# Patient Record
Sex: FEMALE | Race: WHITE | URBAN - METROPOLITAN AREA
[De-identification: names, ages, dates, MRNs, and addresses within clinical notes are randomized per-mention and may not be internally consistent; named-entity substitution may affect disease eponyms.]

---

## 2017-06-29 ENCOUNTER — THERAPY VISIT (OUTPATIENT)
Dept: PHYSICAL THERAPY | Facility: CLINIC | Age: 67
End: 2017-06-29
Payer: COMMERCIAL

## 2017-06-29 DIAGNOSIS — M54.42 LEFT-SIDED LOW BACK PAIN WITH LEFT-SIDED SCIATICA: Primary | ICD-10-CM

## 2017-06-29 DIAGNOSIS — M54.16 LUMBAR RADICULOPATHY: ICD-10-CM

## 2017-06-29 PROCEDURE — 97140 MANUAL THERAPY 1/> REGIONS: CPT | Mod: GP | Performed by: PHYSICAL THERAPIST

## 2017-06-29 PROCEDURE — 97161 PT EVAL LOW COMPLEX 20 MIN: CPT | Mod: GP | Performed by: PHYSICAL THERAPIST

## 2017-06-29 PROCEDURE — 97110 THERAPEUTIC EXERCISES: CPT | Mod: GP | Performed by: PHYSICAL THERAPIST

## 2017-06-29 NOTE — PROGRESS NOTES
Subjective:    Patient is a 67 year old female presenting with rehab left ankle/foot hpi.                                      Pertinent medical history includes:  Migraines and smoking.  Medical allergies: NKA.    Current medications:  Hormone replacement therapy and steroids.  Current occupation is retired.    Primary job tasks include:  Lifting (carrying, pushing, pulling).                                Objective:    System    Physical Exam    General     ROS    Assessment/Plan:

## 2017-06-29 NOTE — PROGRESS NOTES
[facility] Initial Evaluation    Subjective:    Patient is a 67 year old female presenting with rehab back hpi.   Edie López is a 67 year old female with a lumbar (left lumbar and leg pain) condition.      This is a new condition  Patient has chief complaint of 5/25/2017 when she was reaching forward and lifting something in a cooler at [employer], and two other times when reaching forward. Better with ice, Tramadol and Neurontin. She started back on her back exercises that was given for previous back problem  (right leg symptoms last time) Relief with all fours stretch and icing.    Patient reports pain:  Other (left gluteal).  Radiates to:  Thigh left and lower leg left (to ankle).  Pain is described as aching and is intermittent and reported as 1/10.   Pain is worse in the A.M..  Symptoms are exacerbated by certain positions (reaching forward)   Since onset symptoms are gradually improving.  Special tests:  X-ray (severe degenerative disc).      General health as reported by patient is excellent.                                              Objective:      Gait:    Gait Type:  Normal   Assistive Devices:  None      Flexibility/Screens:       Lower Extremity:  Decreased left lower extremity flexibility:Quadriceps                 Lumbar/SI Evaluation  ROM:  AROM Lumbar: normal      Lumbar Myotomes:  normal            Lumbar DTR's:  normal        Lumbar Dermtomes:  normal                Neural Tension/Mobility:    Left side:  Femoral Nerve positive.     Lumbar Palpation:    Tenderness present at Left:    Piriformis and Greater Trochanter  Tenderness not present at Left:    Erector Spinae; PSIS or Vertebral    Tenderness not present at Right:  Vertebral    Lumbar Provocation:  Lumbar provocation: limited left hip ER/ABD.  Left positive with:  PROM hip                                                   General     ROS    Assessment/Plan:      Patient is a 67 year old female with lumbar  complaints.    Patient has the following significant findings with corresponding treatment plan.                Diagnosis 1:  Left lumbar radiculopathy  Pain -  hot/cold therapy, manual therapy and self management  Decreased ROM/flexibility - manual therapy, therapeutic exercise and home program  Decreased strength - therapeutic exercise, therapeutic activities and home program    Therapy Evaluation Codes:   1) History comprised of:   Personal factors that impact the plan of care:      None.    Comorbidity factors that impact the plan of care are:      None.     Medications impacting care: None.  2) Examination of Body Systems comprised of:   Body structures and functions that impact the plan of care:      Hip and Lumbar spine.   Activity limitations that impact the plan of care are:      Lifting and Squatting/kneeling.  3) Clinical presentation characteristics are:   Stable/Uncomplicated.  4) Decision-Making    Low complexity using standardized patient assessment instrument and/or measureable assessment of functional outcome.  Cumulative Therapy Evaluation is: Low complexity.    Previous and current functional limitations:  (See Goal Flow Sheet for this information)    Short term and Long term goals: (See Goal Flow Sheet for this information)     Communication ability:  Patient appears to be able to clearly communicate and understand verbal and written communication and follow directions correctly.  Treatment Explanation - The following has been discussed with the patient:   RX ordered/plan of care  Anticipated outcomes  Possible risks and side effects  This patient would benefit from PT intervention to resume normal activities.   Rehab potential is excellent.    Frequency:  2 X week, once daily  Duration:  for 3 weeks  Discharge Plan:  Achieve all LTG.  Independent in home treatment program.  Reach maximal therapeutic benefit.    Please refer to the daily flowsheet for treatment today, total treatment time and  time spent performing 1:1 timed codes.

## 2017-06-29 NOTE — LETTER
[facility]  [address]  [facility] [address]  [phone]    2017    Re: Edie López   :   1950  MRN:  [id number]   REFERRING PHYSICIAN:   Shabbir Hoffman    [facility]  Date of Initial Evaluation:  2017  Visits:  Rxs Used: 1  Reason for Referral:     Left-sided low back pain with left-sided sciatica  Lumbar radiculopathy    EVALUATION SUMMARY  [facility] Initial Evaluation  Subjective:  Patient is a 67 year old female presenting with rehab back hpi.   Edie López is a 67 year old female with a lumbar (left lumbar and leg pain) condition. This is a new condition  Patient has chief complaint of 2017 when she was reaching forward and lifting something in a cooler at [employer], and two other times when reaching forward. Better with ice, Tramadol and Neurontin. She started back on her back exercises that was given for previous back problem  (right leg symptoms last time) Relief with all fours stretch and icing.    Patient reports pain:  Other (left gluteal).  Radiates to:  Thigh left and lower leg left (to ankle).  Pain is described as aching and is intermittent and reported as 1/10.   Pain is worse in the A.M..  Symptoms are exacerbated by certain positions (reaching forward)   Since onset symptoms are gradually improving.  Special tests:  X-ray (severe degenerative disc). General health as reported by patient is excellent.                Pertinent medical history includes:  Migraines and smoking.  Medical allergies: NKA.    Current medications:  Hormone replacement therapy and steroids.  Current occupation is retired.  Primary job tasks include:  Lifting (carrying, pushing, pulling).    Objective:  Gait:    Gait Type:  Normal   Assistive Devices:  None  Flexibility/Screens:   Lower Extremity:  Decreased left lower extremity flexibility:Quadriceps       Lumbar/SI Evaluation  ROM:  AROM  Lumbar: normal  Lumbar Myotomes:  normal  Lumbar DTR's:  normal  Lumbar Dermtomes:  normal            Re: Edie López   :   1950      Neural Tension/Mobility:    Left side:  Femoral Nerve positive.     Lumbar Palpation:    Tenderness present at Left:    Piriformis and Greater Trochanter  Tenderness not present at Left:    Erector Spinae; PSIS or Vertebral  Tenderness not present at Right:  Vertebral    Lumbar Provocation:  Lumbar provocation: limited left hip ER/ABD.  Left positive with:  PROM hip    Assessment/Plan:    Patient is a 67 year old female with lumbar complaints.    Patient has the following significant findings with corresponding treatment plan.                Diagnosis 1:  Left lumbar radiculopathy  Pain -  hot/cold therapy, manual therapy and self management  Decreased ROM/flexibility - manual therapy, therapeutic exercise and home program  Decreased strength - therapeutic exercise, therapeutic activities and home program    Therapy Evaluation Codes:   1) History comprised of:   Personal factors that impact the plan of care:      None.    Comorbidity factors that impact the plan of care are:      None.     Medications impacting care: None.  2) Examination of Body Systems comprised of:   Body structures and functions that impact the plan of care:      Hip and Lumbar spine.   Activity limitations that impact the plan of care are:      Lifting and Squatting/kneeling.  3) Clinical presentation characteristics are:   Stable/Uncomplicated.  4) Decision-Making    Low complexity using standardized patient assessment instrument and/or measureable assessment of functional outcome.  Cumulative Therapy Evaluation is: Low complexity.    Previous and current functional limitations:  (See Goal Flow Sheet for this information)    Short term and Long term goals: (See Goal Flow Sheet for this information)     Communication ability:  Patient appears to be able to clearly communicate and understand verbal  and written communication and follow directions correctly.  Treatment Explanation - The following has been discussed with the patient:   RX ordered/plan of care  Anticipated outcomes  Possible risks and side effects  This patient would benefit from PT intervention to resume normal activities.   Rehab potential is excellent.  Re: Edie López   :   1950      Frequency:  2 X week, once daily  Duration:  for 3 weeks  Discharge Plan:  Achieve all LTG.  Independent in home treatment program.  Reach maximal therapeutic benefit.                        Thank you for your referral.    INQUIRIES  Therapist: Cinda Daley, PT   [facility]  [address] [facility] [address]  Phone: [phone]  Fax: [phone]

## 2017-07-03 ENCOUNTER — THERAPY VISIT (OUTPATIENT)
Dept: PHYSICAL THERAPY | Facility: CLINIC | Age: 67
End: 2017-07-03
Payer: COMMERCIAL

## 2017-07-03 DIAGNOSIS — M54.42 LEFT-SIDED LOW BACK PAIN WITH LEFT-SIDED SCIATICA: ICD-10-CM

## 2017-07-03 DIAGNOSIS — M54.16 LUMBAR RADICULOPATHY: ICD-10-CM

## 2017-07-03 PROCEDURE — 97140 MANUAL THERAPY 1/> REGIONS: CPT | Mod: GP | Performed by: PHYSICAL THERAPIST

## 2017-07-03 PROCEDURE — 97110 THERAPEUTIC EXERCISES: CPT | Mod: GP | Performed by: PHYSICAL THERAPIST

## 2017-07-06 ENCOUNTER — THERAPY VISIT (OUTPATIENT)
Dept: PHYSICAL THERAPY | Facility: CLINIC | Age: 67
End: 2017-07-06
Payer: COMMERCIAL

## 2017-07-06 DIAGNOSIS — M54.16 LUMBAR RADICULOPATHY: ICD-10-CM

## 2017-07-06 DIAGNOSIS — M54.42 LEFT-SIDED LOW BACK PAIN WITH LEFT-SIDED SCIATICA: ICD-10-CM

## 2017-07-06 PROCEDURE — 97110 THERAPEUTIC EXERCISES: CPT | Mod: GP | Performed by: PHYSICAL THERAPIST

## 2017-07-06 PROCEDURE — 97140 MANUAL THERAPY 1/> REGIONS: CPT | Mod: GP | Performed by: PHYSICAL THERAPIST

## 2017-07-06 NOTE — LETTER
"[facility]  [address] [facility] [id number] [phone]    2017    Re: Edie López   :   1950  MRN:  [id number]   REFERRING PHYSICIAN:   Shabbir Hoffman    [facility]  Date of Initial Evaluation:  2017  Visits:  Rxs Used: 3  Reason for Referral:     Left-sided low back pain with left-sided sciatica  Lumbar radiculopathy    DISCHARGE REPORT  Progress reporting period is from 2017 to 2017.     SUBJECTIVE  Subjective: Significant improvement with no leg pain for past week. Lumbar area a little \"achey\" and home exercise program going well   Current Pain level: 1/10   Initial Pain level: 210   Changes in function: Yes, see goal flow sheet for change in function   Adverse reactions: None    OBJECTIVE  Objective: Lumbar ROM is full and pain free. Core strength increasing      ASSESSMENT/PLAN  STG/LTGs have been met or progress has been made towards goals:  Yes (See Goal flow sheet completed today.)  Assessment of Progress: The patient's condition is improving.  The patient's condition has potential to improve.  Self Management Plans:  Patient is independent in a home treatment program.  Patient is independent in self management of symptoms.  Edie continues to require the following intervention to meet STG and LTG's: PT intervention is no longer required to meet STG/LTG.    Recommendations:  This patient is ready to be discharged from therapy and continue their home treatment program.               Re: Edie López   :   1950                                      Thank you for your referral.    INQUIRIES  Therapist: Cinda Daley PT  [facility]  [address] [facility] [address]  Phone: [phone]  Fax: [phone]     "

## 2017-07-06 NOTE — PROGRESS NOTES
"Subjective:    HPI                    Objective:    System    Physical Exam    General     ROS    Assessment/Plan:      DISCHARGE REPORT    Progress reporting period is from 6/29/2017 to 7/6/2017.     SUBJECTIVE    Subjective: Significant improvement with no leg pain for past week. Lumbar area a little \"achey\" and home exercise program going well     Current Pain level: 1/10   Initial Pain level: 2/10   Changes in function: Yes, see goal flow sheet for change in function   Adverse reactions: None    OBJECTIVE    Objective: Lumbar ROM is full and pain free. Core strength increasing      ASSESSMENT/PLAN    STG/LTGs have been met or progress has been made towards goals:  Yes (See Goal flow sheet completed today.)  Assessment of Progress: The patient's condition is improving.  The patient's condition has potential to improve.  Self Management Plans:  Patient is independent in a home treatment program.  Patient is independent in self management of symptoms.    Edie continues to require the following intervention to meet STG and LTG's: PT intervention is no longer required to meet STG/LTG.      Recommendations:    This patient is ready to be discharged from therapy and continue their home treatment program.    Please refer to the daily flowsheet for treatment today, total treatment time and time spent performing 1:1 timed codes.          "